# Patient Record
Sex: MALE | Race: OTHER
[De-identification: names, ages, dates, MRNs, and addresses within clinical notes are randomized per-mention and may not be internally consistent; named-entity substitution may affect disease eponyms.]

---

## 2018-02-12 ENCOUNTER — HOSPITAL ENCOUNTER (INPATIENT)
Dept: HOSPITAL 74 - YASAS | Age: 48
LOS: 5 days | Discharge: HOME | DRG: 773 | End: 2018-02-17
Attending: INTERNAL MEDICINE | Admitting: INTERNAL MEDICINE
Payer: COMMERCIAL

## 2018-02-12 VITALS — BODY MASS INDEX: 28.9 KG/M2

## 2018-02-12 DIAGNOSIS — F11.23: Primary | ICD-10-CM

## 2018-02-12 DIAGNOSIS — J45.909: ICD-10-CM

## 2018-02-12 DIAGNOSIS — Z91.013: ICD-10-CM

## 2018-02-12 DIAGNOSIS — Z79.84: ICD-10-CM

## 2018-02-12 DIAGNOSIS — I10: ICD-10-CM

## 2018-02-12 DIAGNOSIS — F17.213: ICD-10-CM

## 2018-02-12 DIAGNOSIS — F14.20: ICD-10-CM

## 2018-02-12 DIAGNOSIS — E11.9: ICD-10-CM

## 2018-02-12 DIAGNOSIS — G47.00: ICD-10-CM

## 2018-02-12 DIAGNOSIS — E78.00: ICD-10-CM

## 2018-02-12 DIAGNOSIS — F10.230: ICD-10-CM

## 2018-02-12 LAB
APPEARANCE UR: CLEAR
BILIRUB UR STRIP.AUTO-MCNC: NEGATIVE MG/DL
COLOR UR: YELLOW
KETONES UR QL STRIP: NEGATIVE
LEUKOCYTE ESTERASE UR QL STRIP.AUTO: NEGATIVE
NITRITE UR QL STRIP: NEGATIVE
PH UR: 6 [PH] (ref 5–8)
PROT UR QL STRIP: NEGATIVE
PROT UR QL STRIP: NEGATIVE
RBC # UR STRIP: NEGATIVE /UL
SP GR UR: 1.02 (ref 1–1.03)
UROBILINOGEN UR STRIP-MCNC: 2 MG/DL (ref 0.2–1)

## 2018-02-12 PROCEDURE — HZ2ZZZZ DETOXIFICATION SERVICES FOR SUBSTANCE ABUSE TREATMENT: ICD-10-PCS | Performed by: INTERNAL MEDICINE

## 2018-02-12 RX ADMIN — NICOTINE SCH MG: 21 PATCH TRANSDERMAL at 19:53

## 2018-02-12 RX ADMIN — ATORVASTATIN CALCIUM SCH MG: 10 TABLET, FILM COATED ORAL at 21:59

## 2018-02-12 RX ADMIN — HYDROXYZINE PAMOATE PRN MG: 50 CAPSULE ORAL at 23:37

## 2018-02-12 RX ADMIN — Medication SCH MG: at 21:59

## 2018-02-12 RX ADMIN — ALBUTEROL SULFATE SCH: 2.5 SOLUTION RESPIRATORY (INHALATION) at 20:07

## 2018-02-12 NOTE — PN
[facility] Progress Note


Note: 





Report received from AAYUSH Wong re: patients BP 1/59/113. 


Clonidine 0.1mg STAT dose order


Repeat V/S 


Continue to monitor

## 2018-02-12 NOTE — HP
COWS





- Scale


Resting Pulse: 0= [place] 80 or Below


Sweatin= Chills/Flushing


Restless Observation: 3= Extraneous Movement


Pupil Size: 0= Normal to Room Light


Bone or Joint Aches: 4=Acute Joint/Muscle Pain


Runny Nose/ Eye Tearin= Runny Nose/Eyes


GI Upset > 30mins: 1= Stomach Cramp


Tremor Observation: 1= Tremor Felt, Not Seen


Yawning Observation: 1= 1-2x During Session


Anxiety or Irritability: 2=Irritable/Anxious


Goose Flesh Skin: 0=Smooth Skin


COWS Score: 15





CIWA Score





- CIWA Score


Nausea/Vomitin-No Nausea/No Vomiting


Muscle Tremors: 2


Anxiety: 4-Mod. Anxious/Guarded


Agitation: 3


Paroxysmal Sweats: 1-Minimal Palms Moist


Orientation: 0-Oriented


Tacttile Disturbances: 3-Moderate Itch/Numb/Burn


Auditory Disturbances: 0-None


Visual Disturbances: 0-None


Headache: 1-Very Mild


CIWA-Ar Total Score: 14





Admission ROS S





- HPI


Chief Complaint: 





WITHDRAWAL SX FROM ALCOHOL AND HEROIN


Allergies/Adverse Reactions: 


 Allergies











Allergy/AdvReac Type Severity Reaction Status Date / Time


 


Fish Containing Products Allergy Severe Difficulty Verified 18 17:04





   Breathing  











History of Present Illness: 





48 Y/O MALE WITH A HX HEROIN AND ALCOHOL DEPENDENCE SEEKING DETOX [place].


Exam Limitations: No Limitations





- Ebola screening


Have you traveled outside of the country in the last 21 days: No


Have you had contact with anyone from an Ebola affected area: No


Have you been sick,other than usual withdrawal symptoms: No


Do you have a fever: No





- Review of Systems


Constitutional: Chills, Loss of Appetite, Night Sweats, Changes in sleep


EENT: reports: Tearing, Nose Congestion


Respiratory: reports: No Symptoms reported


Cardiac: reports: Lightheadedness


GI: reports: Constipated, Nausea, Poor Appetite, Poor Fluid Intake, Vomiting, 

Indigestion, Abdominal cramping


: reports: No Symptoms Reported


Musculoskeletal: reports: Back Pain, Joint Pain, Muscle Pain


Integumentary: reports: No Symptoms Reported


Neuro: reports: Unsteady Gait, Dizziness


Endocrine: reports: No Symptoms Reported


Hematology: reports: No Symptoms Reported


Psychiatric: reports: Orientated x3, Anxious


Other Systems: Reviewed and Negative





Patient History





- Patient Medical History


Hx Anemia: No


Hx Asthma: No


Hx Chronic Obstructive Pulmonary Disease (COPD): No


Hx Cardiac Disorders: No


Hx Hypertension: Yes (ON ENALAPRIL 10 MG PO DAILY)


Hx Hypercholesterolemia: Yes (ON  LIPITOR 10 MG HS )


HX Cerebrovascular Accident: No


Hx Seizures: No


Hx Diabetes: Yes (ON METFORMIN 500 MG PO BID)


Hx Liver Disease: No


Hx Genitourinary Disorders: No


Hx Sexually Transmitted Disorders: No (DENIES)


Hx Renal Disease (ESRD): No


Hx Thyroid Disease: No


Hx Human Immunodeficiency Virus (HIV): No (NEGATIVE HX)


Hx Hepatitis C: No


Hx Depression: No (DENIES)


Hx Suicide Attempt: No (DENIES PAST/PRESENT S/H/I)


Hx Bipolar Disorder: No


Hx Schizophrenia: No





- Patient Surgical History


Past Surgical History: No


Hx Neurologic Surgery: No


Hx Cataract Extraction: No


Hx Cardiac Surgery: No


Hx Lung Surgery: No


Hx Breast Surgery: No


Hx Breast Biopsy: No


Hx Abdominal Surgery: No


Hx Appendectomy: No


Hx Cholecystectomy: No


Hx Genitourinary Surgery: No


Hx Orthopedic Surgery: No


Anesthesia Reaction: No





- PPD History


Previous Implant?: Yes


Documented Results: Negative w/o proof


Implanted On Prior R Admission?: No


PPD to be Administered?: Yes





- Reproductive History


Patient is a Female of Child Bearing Age (11 -55 yrs old): No (MALE)





- Smoking Cessation


Smoking history: Current every day smoker


Have you smoked in the past 12 months: Yes


Aproximately how many cigarettes per day: 35


Hx Chewing Tobacco Use: No


Initiated information on smoking cessation: Yes


'Breaking Loose' booklet given: 18





- Substance & Tx. History


Hx Alcohol Use: Yes (VODKA/COGNAC)


Hx Substance Use: Yes (HEROIN)


Substance Use Type: Alcohol, Heroin


Hx Substance Use Treatment: Yes (LAST TX AT [facility] LONG TIME AGO)





- Substances Abused


  ** Alcohol


Route: Oral


Amount used: 1/2 FIFTH TO A FIFTH


Age of first use: 26


Date of Last Use: 18





  ** Heroin


Route: Inhalation


Frequency: Daily


Amount used: 35 - 40 BAGS


Age of first use: 34


Date of Last Use: 18





Family Disease History





- Family Disease History


Family Disease History: Diabetes: Grandparent (HTN), [place]: Mother (), 

Other: Grandparent





Admission Physical Exam [facility]





- Vital Signs


Vital Signs: 


 Vital Signs - 24 hr











  18





  14:17


 


Temperature 97.4 F L


 


Pulse Rate 79


 


Respiratory 18





Rate 


 


Blood Pressure 144/99














- Physical


General Appearance: Yes: Moderate Distress, Alcohol on Breath, Irritable, 

Anxious


HEENTM: Yes: EOMI, Normocephalic, JAQUELIN, Pharynx Normal, Nasal Congestion


Respiratory: Yes: Chest Non-Tender, Lungs Clear, Normal Breath Sounds


Neck: Yes: No masses,lesions,Nodules, Supple, Trachea in good position


Breast: Yes: Breast Exam Deferred


Cardiology: Yes: Regular Rhythm, Regular Rate, S1, S2


Abdominal: Yes: Normal Bowel Sounds, Non Tender, Flat, Soft


Genitourinary: Yes: Other (N/C)


Back: Yes: Within Normal Limits


Musculoskeletal: Yes: full range of Motion, Gait Steady


Extremities: Yes: Normal Range of Motion, Non-Tender


Neurological: Yes: CNs II-XII NML intact, Fully Oriented, Alert, Motor Strength 

5/5


Integumentary: Yes: Dry, Warm


Lymphatic: Yes: Within Normal Limits





- Diagnostic


(1) Opioid dependence with withdrawal


Current Visit: Yes   Status: Acute   





(2) Alcohol dependence with uncomplicated withdrawal


Current Visit: Yes   Status: Acute   





(3) Hypertension


Current Visit: Yes   Status: Chronic   


Qualifiers: 


   Hypertension type: essential hypertension   Qualified Code(s): I10 - 

Essential (primary) hypertension   





(4) Hypercholesterolemia


Current Visit: Yes   Status: Chronic   





(5) Type 2 diabetes mellitus


Current Visit: Yes   Status: Chronic   


Qualifiers: 


   Diabetes mellitus complication status: without complication 





(6) Nicotine dependence


Current Visit: Yes   Status: Acute   


Qualifiers: 


   Nicotine product type: cigarettes   Substance use status: in withdrawal   

Qualified Code(s): F17.213 - Nicotine dependence, cigarettes, with withdrawal   





Cleared for Admission [facility]





- Detox or Rehab


[facility] Level of Care: Medically Managed


Detox Regimen/Protocol: Methadone/Librium





[facility] Breath Alcohol Content


Breath Alcohol Content: 10





Urine Drug Screen





- Results


Drug Screen Negative: No


Urine Drug Screen Results: GRIFFIN-Cocaine, OPI-Opiates, MTD-Methadone, OXY-

Oxycodone

## 2018-02-13 LAB
ALBUMIN SERPL-MCNC: 3.7 G/DL (ref 3.4–5)
ALP SERPL-CCNC: 80 U/L (ref 45–117)
ALT SERPL-CCNC: 23 U/L (ref 12–78)
ANION GAP SERPL CALC-SCNC: 8 MMOL/L (ref 8–16)
AST SERPL-CCNC: 19 U/L (ref 15–37)
BILIRUB SERPL-MCNC: 0.5 MG/DL (ref 0.2–1)
BUN SERPL-MCNC: 14 MG/DL (ref 7–18)
CALCIUM SERPL-MCNC: 9.3 MG/DL (ref 8.5–10.1)
CHLORIDE SERPL-SCNC: 107 MMOL/L (ref 98–107)
CO2 SERPL-SCNC: 29 MMOL/L (ref 21–32)
CREAT SERPL-MCNC: 0.7 MG/DL (ref 0.7–1.3)
DEPRECATED RDW RBC AUTO: 13.1 % (ref 11.9–15.9)
GLUCOSE SERPL-MCNC: 98 MG/DL (ref 74–106)
HCT VFR BLD CALC: 41.2 % (ref 35.4–49)
HGB BLD-MCNC: 13.8 GM/DL (ref 11.7–16.9)
MCH RBC QN AUTO: 30.3 PG (ref 25.7–33.7)
MCHC RBC AUTO-ENTMCNC: 33.4 G/DL (ref 32–35.9)
MCV RBC: 90.7 FL (ref 80–96)
PLATELET # BLD AUTO: 127 K/MM3 (ref 134–434)
PMV BLD: 9.9 FL (ref 7.5–11.1)
POTASSIUM SERPLBLD-SCNC: 3.5 MMOL/L (ref 3.5–5.1)
PROT SERPL-MCNC: 6.7 G/DL (ref 6.4–8.2)
RBC # BLD AUTO: 4.55 M/MM3 (ref 4–5.6)
SICKLE CELL SCREEN: NEGATIVE
SODIUM SERPL-SCNC: 144 MMOL/L (ref 136–145)
WBC # BLD AUTO: 10.1 K/MM3 (ref 4–10)

## 2018-02-13 RX ADMIN — Medication SCH TAB: at 10:43

## 2018-02-13 RX ADMIN — ALBUTEROL SULFATE SCH: 2.5 SOLUTION RESPIRATORY (INHALATION) at 22:08

## 2018-02-13 RX ADMIN — METFORMIN HYDROCHLORIDE SCH MG: 500 TABLET ORAL at 17:37

## 2018-02-13 RX ADMIN — NICOTINE SCH MG: 21 PATCH TRANSDERMAL at 10:44

## 2018-02-13 RX ADMIN — HYDROXYZINE PAMOATE PRN MG: 50 CAPSULE ORAL at 22:08

## 2018-02-13 RX ADMIN — ASPIRIN 81 MG SCH MG: 81 TABLET ORAL at 10:43

## 2018-02-13 RX ADMIN — LISINOPRIL SCH MG: 10 TABLET ORAL at 10:43

## 2018-02-13 RX ADMIN — METFORMIN HYDROCHLORIDE SCH MG: 500 TABLET ORAL at 07:06

## 2018-02-13 RX ADMIN — Medication SCH MG: at 22:06

## 2018-02-13 RX ADMIN — ALBUTEROL SULFATE SCH: 2.5 SOLUTION RESPIRATORY (INHALATION) at 10:44

## 2018-02-13 RX ADMIN — ALBUTEROL SULFATE SCH: 2.5 SOLUTION RESPIRATORY (INHALATION) at 16:20

## 2018-02-13 RX ADMIN — ATORVASTATIN CALCIUM SCH MG: 10 TABLET, FILM COATED ORAL at 22:06

## 2018-02-13 NOTE — EKG
Test Reason : 

Blood Pressure : ***/*** mmHG

Vent. Rate : 076 BPM     Atrial Rate : 076 BPM

   P-R Int : 134 ms          QRS Dur : 090 ms

    QT Int : 388 ms       P-R-T Axes : 072 029 043 degrees

   QTc Int : 436 ms

 

NORMAL SINUS RHYTHM

POSSIBLE LEFT ATRIAL ENLARGEMENT

BORDERLINE ECG

NO PREVIOUS ECGS AVAILABLE

Confirmed by MD Yehuda, Jm ([id number]) on 2/13/2018 1:58:28 PM

 

Referred By:             Confirmed By:Jm Blackman MD

## 2018-02-13 NOTE — PN
S CIWA





- CIWA Score


Nausea/Vomitin-No Nausea/No Vomiting


Muscle Tremors: 4-Moderate,w/Arms Extend


Anxiety: 4-Mod. Anxious/Guarded


Agitation: 4-Moderately Restless


Paroxysmal Sweats: 1-Minimal Palms Moist


Orientation: 0-Oriented


Tacttile Disturbances: 3-Moderate Itch/Numb/Burn


Auditory Disturbances: 0-None


Visual Disturbances: 0-None


Headache: 0-None Present


CIWA-Ar Total Score: 16





[facility] COWS





- Scale


Resting Pulse: 0= [place] 80 or Below


Sweatin= Chills/Flushing


Restless Observation: 3= Extraneous Movement


Pupil Size: 2= Moderately Dilated


Bone or Joint Aches: 4=Acute Joint/Muscle Pain


Runny Nose/ Eye Tearin= Nasal Congestion


GI Upset > 30mins: 0= None


Tremor Observation of Outstretched Hands: 1= Tremor Felt, Not Seen


Yawning Observation: 1= 1-2x During Session


Anxiety or Irritability: 2=Irritable/Anxious


Goose Flesh Skin: 0=Smooth Skin


COWS Score: 15





[facility] Progress Note (SOAP)


Subjective: 





C/O IRRITABILITY,RESTLESSNESS,ANXIETY, HOT/COLD FLASHES,INTERMITTENT SLEEP LAST 

NIGHT,


Objective: 





18 11:35


 Vital Signs











Temperature  96.2 F L  18 09:50


 


Pulse Rate  74   18 09:50


 


Respiratory Rate  20   18 09:50


 


Blood Pressure  137/98   18 09:50


 


O2 Sat by Pulse Oximetry (%)      








 Laboratory Last Values











WBC  10.1 K/mm3 (4.0-10.0)  H  18  07:30    


 


RBC  4.55 M/mm3 (4.00-5.60)   18  07:30    


 


Hgb  13.8 GM/dL (11.7-16.9)   18  07:30    


 


Hct  41.2 % (35.4-49)   18  07:30    


 


MCV  90.7 fl (80-96)   18  07:30    


 


MCH  30.3 pg (25.7-33.7)   18  07:30    


 


MCHC  33.4 g/dl (32.0-35.9)   18  07:30    


 


RDW  13.1 % (11.9-15.9)   18  07:30    


 


Plt Count  127 K/MM3 (134-434)  L  18  07:30    


 


MPV  9.9 fl (7.5-11.1)   18  07:30    


 


Sodium  144 mmol/L (136-145)   18  07:30    


 


Potassium  3.5 mmol/L (3.5-5.1)   18  07:30    


 


Chloride  107 mmol/L ()   18  07:30    


 


Carbon Dioxide  29 mmol/L (21-32)   18  07:30    


 


Anion Gap  8  (8-16)   18  07:30    


 


BUN  14 mg/dL (7-18)   18  07:30    


 


Creatinine  0.7 mg/dL (0.7-1.3)   18  07:30    


 


Creat Clearance w eGFR  > 60  (>60)   18  07:30    


 


POC Glucometer  84 UNITS ()   18  05:21    


 


Random Glucose  98 mg/dL ()   18  07:30    


 


Calcium  9.3 mg/dL (8.5-10.1)   18  07:30    


 


Total Bilirubin  0.5 mg/dL (0.2-1.0)   18  07:30    


 


AST  19 U/L (15-37)   18  07:30    


 


ALT  23 U/L (12-78)   18  07:30    


 


Alkaline Phosphatase  80 U/L ()   18  07:30    


 


Total Protein  6.7 g/dl (6.4-8.2)   18  07:30    


 


Albumin  3.7 g/dl (3.4-5.0)   18  07:30    


 


Urine Color  Yellow   18  22:50    


 


Urine Appearance  Clear   18  22:50    


 


Urine pH  6.0  (5.0-8.0)   18  22:50    


 


Ur Specific Gravity  1.024  (1.001-1.035)   18  22:50    


 


Urine Protein  Negative  (NEGATIVE)   18  22:50    


 


Urine Glucose (UA)  Negative  (NEGATIVE)   18  22:50    


 


Urine Ketones  Negative  (NEGATIVE)   18  22:50    


 


Urine Blood  Negative  (NEGATIVE)   18  22:50    


 


Urine Nitrite  Negative  (NEGATIVE)   18  22:50    


 


Urine Bilirubin  Negative  (NEGATIVE)   18  22:50    


 


Urine Urobilinogen  2.0 mg/dL (0.2-1.0)   18  22:50    


 


Ur Leukocyte Esterase  Negative  (NEGATIVE)   18  22:50    











Assessment: 





18 11:36


WITHDRAWAL SX


Plan: 





CONTINUE DETOX

## 2018-02-14 RX ADMIN — NICOTINE SCH: 21 PATCH TRANSDERMAL at 11:07

## 2018-02-14 RX ADMIN — METFORMIN HYDROCHLORIDE SCH MG: 500 TABLET ORAL at 17:27

## 2018-02-14 RX ADMIN — LISINOPRIL SCH MG: 10 TABLET ORAL at 10:43

## 2018-02-14 RX ADMIN — CYCLOBENZAPRINE HYDROCHLORIDE SCH MG: 10 TABLET, FILM COATED ORAL at 22:05

## 2018-02-14 RX ADMIN — ATORVASTATIN CALCIUM SCH MG: 10 TABLET, FILM COATED ORAL at 22:05

## 2018-02-14 RX ADMIN — ALBUTEROL SULFATE SCH AMP: 2.5 SOLUTION RESPIRATORY (INHALATION) at 16:50

## 2018-02-14 RX ADMIN — HYDROXYZINE PAMOATE PRN MG: 50 CAPSULE ORAL at 17:37

## 2018-02-14 RX ADMIN — Medication SCH MG: at 22:05

## 2018-02-14 RX ADMIN — ALBUTEROL SULFATE SCH: 2.5 SOLUTION RESPIRATORY (INHALATION) at 11:06

## 2018-02-14 RX ADMIN — METHADONE HYDROCHLORIDE SCH MG: 5 TABLET ORAL at 10:43

## 2018-02-14 RX ADMIN — ASPIRIN 81 MG SCH MG: 81 TABLET ORAL at 10:43

## 2018-02-14 RX ADMIN — Medication SCH TAB: at 10:43

## 2018-02-14 RX ADMIN — HYDROXYZINE PAMOATE PRN MG: 50 CAPSULE ORAL at 22:06

## 2018-02-14 RX ADMIN — ALBUTEROL SULFATE SCH: 2.5 SOLUTION RESPIRATORY (INHALATION) at 20:30

## 2018-02-14 RX ADMIN — METFORMIN HYDROCHLORIDE SCH MG: 500 TABLET ORAL at 06:06

## 2018-02-14 RX ADMIN — CYCLOBENZAPRINE HYDROCHLORIDE SCH MG: 10 TABLET, FILM COATED ORAL at 13:45

## 2018-02-14 NOTE — PN
S CIWA





- CIWA Score


Nausea/Vomitin-No Nausea/No Vomiting


Muscle Tremors: 4-Moderate,w/Arms Extend


Anxiety: 4-Mod. Anxious/Guarded


Agitation: 4-Moderately Restless


Paroxysmal Sweats: 1-Minimal Palms Moist


Orientation: 0-Oriented


Tacttile Disturbances: 3-Moderate Itch/Numb/Burn


Auditory Disturbances: 0-None


Visual Disturbances: 0-None


Headache: 0-None Present


CIWA-Ar Total Score: 16





[facility] COWS





- Scale


Resting Pulse: 0= [place] 80 or Below


Sweatin= Chills/Flushing


Restless Observation: 3= Extraneous Movement


Pupil Size: 2= Moderately Dilated


Bone or Joint Aches: 4=Acute Joint/Muscle Pain


Runny Nose/ Eye Tearin= Nasal Congestion


GI Upset > 30mins: 0= None


Tremor Observation of Outstretched Hands: 2= Slight Tremor Visible


Yawning Observation: 2= >3x During Session


Anxiety or Irritability: 2=Irritable/Anxious


Goose Flesh Skin: 0=Smooth Skin


COWS Score: 17





B[facility] Progress Note (SOAP)


Subjective: 





BODY ACHES,ANXIETY,IRRITABILITY,SWEATS,INTERMITTENT SLEEP.


Objective: 





18 11:33


 Vital Signs











Temperature  95.5 F L  18 09:43


 


Pulse Rate  73   18 09:43


 


Respiratory Rate  18   18 09:43


 


Blood Pressure  155/102   18 09:43


 


O2 Sat by Pulse Oximetry (%)      








 Laboratory Last Values











WBC  10.1 K/mm3 (4.0-10.0)  H  18  07:30    


 


RBC  4.55 M/mm3 (4.00-5.60)   18  07:30    


 


Hgb  13.8 GM/dL (11.7-16.9)   18  07:30    


 


Hct  41.2 % (35.4-49)   18  07:30    


 


MCV  90.7 fl (80-96)   18  07:30    


 


MCH  30.3 pg (25.7-33.7)   18  07:30    


 


MCHC  33.4 g/dl (32.0-35.9)   18  07:30    


 


RDW  13.1 % (11.9-15.9)   18  07:30    


 


Plt Count  127 K/MM3 (134-434)  L  18  07:30    


 


MPV  9.9 fl (7.5-11.1)   18  07:30    


 


Sickle Cell Screen  Negative  (NEGATIVE)   18  07:30    


 


Sodium  144 mmol/L (136-145)   18  07:30    


 


Potassium  3.5 mmol/L (3.5-5.1)   18  07:30    


 


Chloride  107 mmol/L ()   18  07:30    


 


Carbon Dioxide  29 mmol/L (21-32)   18  07:30    


 


Anion Gap  8  (8-16)   18  07:30    


 


BUN  14 mg/dL (7-18)   18  07:30    


 


Creatinine  0.7 mg/dL (0.7-1.3)   18  07:30    


 


Creat Clearance w eGFR  > 60  (>60)   18  07:30    


 


POC Glucometer  150 UNITS ()   18  06:06    


 


Random Glucose  98 mg/dL ()   18  07:30    


 


Calcium  9.3 mg/dL (8.5-10.1)   18  07:30    


 


Total Bilirubin  0.5 mg/dL (0.2-1.0)   18  07:30    


 


AST  19 U/L (15-37)   18  07:30    


 


ALT  23 U/L (12-78)   18  07:30    


 


Alkaline Phosphatase  80 U/L ()   18  07:30    


 


Total Protein  6.7 g/dl (6.4-8.2)   18  07:30    


 


Albumin  3.7 g/dl (3.4-5.0)   18  07:30    


 


Urine Color  Yellow   18  22:50    


 


Urine Appearance  Clear   18  22:50    


 


Urine pH  6.0  (5.0-8.0)   18  22:50    


 


Ur Specific Gravity  1.024  (1.001-1.035)   18  22:50    


 


Urine Protein  Negative  (NEGATIVE)   18  22:50    


 


Urine Glucose (UA)  Negative  (NEGATIVE)   18  22:50    


 


Urine Ketones  Negative  (NEGATIVE)   18  22:50    


 


Urine Blood  Negative  (NEGATIVE)   18  22:50    


 


Urine Nitrite  Negative  (NEGATIVE)   18  22:50    


 


Urine Bilirubin  Negative  (NEGATIVE)   18  22:50    


 


Urine Urobilinogen  2.0 mg/dL (0.2-1.0)   18  22:50    


 


Ur Leukocyte Esterase  Negative  (NEGATIVE)   18  22:50    


 


RPR Titer  Nonreactive  (NONREACTIVE)   18  07:30    











Assessment: 





18 11:33


WITHDRAWAL SX


Plan: 





CONTINUE DETOX


BENADRYL AS DIRECTED HS

## 2018-02-15 RX ADMIN — LISINOPRIL SCH MG: 10 TABLET ORAL at 10:33

## 2018-02-15 RX ADMIN — ASPIRIN 81 MG SCH MG: 81 TABLET ORAL at 10:33

## 2018-02-15 RX ADMIN — ALBUTEROL SULFATE SCH: 2.5 SOLUTION RESPIRATORY (INHALATION) at 11:28

## 2018-02-15 RX ADMIN — Medication SCH TAB: at 10:34

## 2018-02-15 RX ADMIN — METHADONE HYDROCHLORIDE SCH MG: 5 TABLET ORAL at 10:33

## 2018-02-15 RX ADMIN — ALBUTEROL SULFATE SCH: 2.5 SOLUTION RESPIRATORY (INHALATION) at 15:11

## 2018-02-15 RX ADMIN — CYCLOBENZAPRINE HYDROCHLORIDE SCH MG: 10 TABLET, FILM COATED ORAL at 15:11

## 2018-02-15 RX ADMIN — NICOTINE SCH: 21 PATCH TRANSDERMAL at 10:34

## 2018-02-15 RX ADMIN — ALBUTEROL SULFATE SCH: 2.5 SOLUTION RESPIRATORY (INHALATION) at 22:11

## 2018-02-15 RX ADMIN — ALBUTEROL SULFATE SCH: 2.5 SOLUTION RESPIRATORY (INHALATION) at 17:26

## 2018-02-15 RX ADMIN — HYDROXYZINE PAMOATE PRN MG: 50 CAPSULE ORAL at 17:28

## 2018-02-15 RX ADMIN — METFORMIN HYDROCHLORIDE SCH: 500 TABLET ORAL at 08:14

## 2018-02-15 RX ADMIN — CYCLOBENZAPRINE HYDROCHLORIDE SCH MG: 10 TABLET, FILM COATED ORAL at 22:11

## 2018-02-15 RX ADMIN — CYCLOBENZAPRINE HYDROCHLORIDE SCH: 10 TABLET, FILM COATED ORAL at 08:14

## 2018-02-15 RX ADMIN — ATORVASTATIN CALCIUM SCH MG: 10 TABLET, FILM COATED ORAL at 22:11

## 2018-02-15 RX ADMIN — Medication SCH MG: at 22:10

## 2018-02-15 RX ADMIN — METFORMIN HYDROCHLORIDE SCH MG: 500 TABLET ORAL at 17:26

## 2018-02-15 NOTE — PN
BHS Progress Note (SOAP)


Subjective: 





ANXIETY,SWEATS,IRRITABILITY,INTERMITTENT SLEEP.


Objective: 





02/15/18 12:07


 Vital Signs











Temperature  95.8 F L  02/15/18 09:33


 


Pulse Rate  62   02/15/18 09:33


 


Respiratory Rate  18   02/15/18 09:33


 


Blood Pressure  145/91   02/15/18 09:33


 


O2 Sat by Pulse Oximetry (%)      








 Laboratory Last Values











WBC  10.1 K/mm3 (4.0-10.0)  H  02/13/18  07:30    


 


RBC  4.55 M/mm3 (4.00-5.60)   02/13/18  07:30    


 


Hgb  13.8 GM/dL (11.7-16.9)   02/13/18  07:30    


 


Hct  41.2 % (35.4-49)   02/13/18  07:30    


 


MCV  90.7 fl (80-96)   02/13/18  07:30    


 


MCH  30.3 pg (25.7-33.7)   02/13/18  07:30    


 


MCHC  33.4 g/dl (32.0-35.9)   02/13/18  07:30    


 


RDW  13.1 % (11.9-15.9)   02/13/18  07:30    


 


Plt Count  127 K/MM3 (134-434)  L  02/13/18  07:30    


 


MPV  9.9 fl (7.5-11.1)   02/13/18  07:30    


 


Sickle Cell Screen  Negative  (NEGATIVE)   02/13/18  07:30    


 


Sodium  144 mmol/L (136-145)   02/13/18  07:30    


 


Potassium  3.5 mmol/L (3.5-5.1)   02/13/18  07:30    


 


Chloride  107 mmol/L ()   02/13/18  07:30    


 


Carbon Dioxide  29 mmol/L (21-32)   02/13/18  07:30    


 


Anion Gap  8  (8-16)   02/13/18  07:30    


 


BUN  14 mg/dL (7-18)   02/13/18  07:30    


 


Creatinine  0.7 mg/dL (0.7-1.3)   02/13/18  07:30    


 


Creat Clearance w eGFR  > 60  (>60)   02/13/18  07:30    


 


POC Glucometer  105 UNITS ()   02/14/18  16:26    


 


Random Glucose  98 mg/dL ()   02/13/18  07:30    


 


Calcium  9.3 mg/dL (8.5-10.1)   02/13/18  07:30    


 


Total Bilirubin  0.5 mg/dL (0.2-1.0)   02/13/18  07:30    


 


AST  19 U/L (15-37)   02/13/18  07:30    


 


ALT  23 U/L (12-78)   02/13/18  07:30    


 


Alkaline Phosphatase  80 U/L ()   02/13/18  07:30    


 


Total Protein  6.7 g/dl (6.4-8.2)   02/13/18  07:30    


 


Albumin  3.7 g/dl (3.4-5.0)   02/13/18  07:30    


 


Urine Color  Yellow   02/12/18  22:50    


 


Urine Appearance  Clear   02/12/18  22:50    


 


Urine pH  6.0  (5.0-8.0)   02/12/18  22:50    


 


Ur Specific Gravity  1.024  (1.001-1.035)   02/12/18  22:50    


 


Urine Protein  Negative  (NEGATIVE)   02/12/18  22:50    


 


Urine Glucose (UA)  Negative  (NEGATIVE)   02/12/18  22:50    


 


Urine Ketones  Negative  (NEGATIVE)   02/12/18  22:50    


 


Urine Blood  Negative  (NEGATIVE)   02/12/18  22:50    


 


Urine Nitrite  Negative  (NEGATIVE)   02/12/18  22:50    


 


Urine Bilirubin  Negative  (NEGATIVE)   02/12/18  22:50    


 


Urine Urobilinogen  2.0 mg/dL (0.2-1.0)   02/12/18  22:50    


 


Ur Leukocyte Esterase  Negative  (NEGATIVE)   02/12/18  22:50    


 


RPR Titer  Nonreactive  (NONREACTIVE)   02/13/18  07:30    











Assessment: 





02/15/18 12:07


WITHDRAWAL SX


Plan: 





CONTINUE DETOX


BENADRYL HS AS DIRECTED

## 2018-02-16 RX ADMIN — CYCLOBENZAPRINE HYDROCHLORIDE SCH MG: 10 TABLET, FILM COATED ORAL at 07:20

## 2018-02-16 RX ADMIN — Medication SCH TAB: at 10:23

## 2018-02-16 RX ADMIN — METFORMIN HYDROCHLORIDE SCH MG: 500 TABLET ORAL at 17:32

## 2018-02-16 RX ADMIN — Medication SCH MG: at 22:15

## 2018-02-16 RX ADMIN — ALBUTEROL SULFATE SCH: 2.5 SOLUTION RESPIRATORY (INHALATION) at 22:17

## 2018-02-16 RX ADMIN — ASPIRIN 81 MG SCH MG: 81 TABLET ORAL at 10:23

## 2018-02-16 RX ADMIN — ALBUTEROL SULFATE SCH: 2.5 SOLUTION RESPIRATORY (INHALATION) at 10:46

## 2018-02-16 RX ADMIN — METFORMIN HYDROCHLORIDE SCH MG: 500 TABLET ORAL at 07:19

## 2018-02-16 RX ADMIN — ATORVASTATIN CALCIUM SCH MG: 10 TABLET, FILM COATED ORAL at 22:15

## 2018-02-16 RX ADMIN — CYCLOBENZAPRINE HYDROCHLORIDE SCH: 10 TABLET, FILM COATED ORAL at 15:12

## 2018-02-16 RX ADMIN — LISINOPRIL SCH MG: 10 TABLET ORAL at 10:23

## 2018-02-16 RX ADMIN — CYCLOBENZAPRINE HYDROCHLORIDE SCH MG: 10 TABLET, FILM COATED ORAL at 22:15

## 2018-02-16 RX ADMIN — NICOTINE SCH: 21 PATCH TRANSDERMAL at 10:46

## 2018-02-16 RX ADMIN — ALBUTEROL SULFATE SCH: 2.5 SOLUTION RESPIRATORY (INHALATION) at 15:12

## 2018-02-16 NOTE — CONSULT
[facility] Psychiatric Consult





- Data


Date of interview: 02/16/18


Admission source: [facility]


Identifying data: First admission to [facility] for this 46 y/o  male 

seeking detox treatment [address] for alcohol,heroin and cocaine 

dependence.Patient is single,father of one,domiciled,unemployed and supported 

on food stamps.


Substance Abuse History: Confirmed by patient in this session.Refer to current 

[facility] report for details.Smoking history: Current every day smoker.  Have you 

smoked in the past 12 months: Yes.  Aproximately how many cigarettes per day: 

35.  Hx Chewing Tobacco Use: No.  Initiated information on smoking cessation: 

Yes.  'Breaking Loose' booklet given: 02/12/18.  - Substance & Tx. History.  Hx 

Alcohol Use: Yes (VODKA/COGNAC).  Hx Substance Use: Yes (HEROIN).  Substance 

Use Type: Alcohol, Heroin.  Hx Substance Use Treatment: Yes (LAST TX AT [facility] LONG TIME AGO).  - Substances Abused.  ** Alcohol.  Route: Oral.  

Amount used: 1/2 FIFTH TO A FIFTH.  Age of first use: 26.  Date of Last Use: 02/ 12/18.  ** Heroin.  Route: Inhalation.  Frequency: Daily.  Amount used: 35 - 40 

BAGS.  Age of first use: 34.  Date of Last Use: 02/12/18


Medical History: Hypertension,diabetes mellitus and dyslipidemia.


Psychiatric History: Patient denies.


Physical/Sexual Abuse/Trauma History: Patient denies.


Additional Comment: Urine Drug Screen Results: GRIFFIN-Cocaine, OPI-Opiates, MTD-

Methadone, OXY-Oxycodone.Noted.





Mental Status Exam





- Mental Status Exam


Alert and Oriented to: Time, Place, Person


Cognitive Function: Good


Patient Appearance: Well Groomed


Mood: Withdrawn, Hopeful


Affect: Appropriate, Normal Range


Patient Behavior: Fatigued, Appropriate, Cooperative


Speech Pattern: Clear, Appropriate


Voice Loudness: Normal


Thought Process: Intact, Goal Oriented


Thought Disorder: Not Present


Hallucinations: Denies


Suicidal Ideation: Denies


Homicidal Ideation: Denies


Insight/Judgement: Poor


Sleep: Poorly, Difficulty falling asleep


Appetite: Good


Muscle strength/Tone: Normal


Gait/Station: Normal





Psychiatric Findings





- Problem List (Axis 1, 2,3)


(1) Opioid dependence with withdrawal


Current Visit: Yes   Status: Acute   





(2) Alcohol dependence with uncomplicated withdrawal


Current Visit: Yes   Status: Acute   





(3) Nicotine dependence


Current Visit: Yes   Status: Acute   


Qualifiers: 


   Nicotine product type: cigarettes   Substance use status: in withdrawal   

Qualified Code(s): F17.213 - Nicotine dependence, cigarettes, with withdrawal   





(4) Cocaine dependence


Current Visit: Yes   Status: Acute   





(5) Insomnia


Current Visit: Yes   Status: Acute   





- Initial Treatment Plan


Initial Treatment Plan: Psychoeducation and support.Detoxification in 

progress.Sleep hygiene.Ambien 10 mg po hs prn.Patient is informed of risk of 

parasomnias.He agrees to this careplan.Observation.

## 2018-02-16 NOTE — PN
[facility] Progress Note (SOAP)


Subjective: 





PT IS OOB AMBULATING WITH STAEDY GAIT. C/O NOT SLEEPING WELL EVEN WITH THE 

BENADRYL. 


Objective: 





02/16/18 10:27


 Vital Signs











Temperature  97.8 F   02/16/18 09:36


 


Pulse Rate  95 H  02/16/18 09:36


 


Respiratory Rate  20   02/16/18 09:36


 


Blood Pressure  126/86   02/16/18 09:36


 


O2 Sat by Pulse Oximetry (%)      








 Laboratory Last Values











WBC  10.1 K/mm3 (4.0-10.0)  H  02/13/18  07:30    


 


RBC  4.55 M/mm3 (4.00-5.60)   02/13/18  07:30    


 


Hgb  13.8 GM/dL (11.7-16.9)   02/13/18  07:30    


 


Hct  41.2 % (35.4-49)   02/13/18  07:30    


 


MCV  90.7 fl (80-96)   02/13/18  07:30    


 


MCH  30.3 pg (25.7-33.7)   02/13/18  07:30    


 


MCHC  33.4 g/dl (32.0-35.9)   02/13/18  07:30    


 


RDW  13.1 % (11.9-15.9)   02/13/18  07:30    


 


Plt Count  127 K/MM3 (134-434)  L  02/13/18  07:30    


 


MPV  9.9 fl (7.5-11.1)   02/13/18  07:30    


 


Sickle Cell Screen  Negative  (NEGATIVE)   02/13/18  07:30    


 


Sodium  144 mmol/L (136-145)   02/13/18  07:30    


 


Potassium  3.5 mmol/L (3.5-5.1)   02/13/18  07:30    


 


Chloride  107 mmol/L ()   02/13/18  07:30    


 


Carbon Dioxide  29 mmol/L (21-32)   02/13/18  07:30    


 


Anion Gap  8  (8-16)   02/13/18  07:30    


 


BUN  14 mg/dL (7-18)   02/13/18  07:30    


 


Creatinine  0.7 mg/dL (0.7-1.3)   02/13/18  07:30    


 


Creat Clearance w eGFR  > 60  (>60)   02/13/18  07:30    


 


POC Glucometer  258 UNITS ()   02/16/18  05:10    


 


Random Glucose  98 mg/dL ()   02/13/18  07:30    


 


Calcium  9.3 mg/dL (8.5-10.1)   02/13/18  07:30    


 


Total Bilirubin  0.5 mg/dL (0.2-1.0)   02/13/18  07:30    


 


AST  19 U/L (15-37)   02/13/18  07:30    


 


ALT  23 U/L (12-78)   02/13/18  07:30    


 


Alkaline Phosphatase  80 U/L ()   02/13/18  07:30    


 


Total Protein  6.7 g/dl (6.4-8.2)   02/13/18  07:30    


 


Albumin  3.7 g/dl (3.4-5.0)   02/13/18  07:30    


 


Urine Color  Yellow   02/12/18  22:50    


 


Urine Appearance  Clear   02/12/18  22:50    


 


Urine pH  6.0  (5.0-8.0)   02/12/18  22:50    


 


Ur Specific Gravity  1.024  (1.001-1.035)   02/12/18  22:50    


 


Urine Protein  Negative  (NEGATIVE)   02/12/18  22:50    


 


Urine Glucose (UA)  Negative  (NEGATIVE)   02/12/18  22:50    


 


Urine Ketones  Negative  (NEGATIVE)   02/12/18  22:50    


 


Urine Blood  Negative  (NEGATIVE)   02/12/18  22:50    


 


Urine Nitrite  Negative  (NEGATIVE)   02/12/18  22:50    


 


Urine Bilirubin  Negative  (NEGATIVE)   02/12/18  22:50    


 


Urine Urobilinogen  2.0 mg/dL (0.2-1.0)   02/12/18  22:50    


 


Ur Leukocyte Esterase  Negative  (NEGATIVE)   02/12/18  22:50    


 


RPR Titer  Nonreactive  (NONREACTIVE)   02/13/18  07:30    











Assessment: 





02/16/18 10:27


WITHDRAWAL SX


Plan: 





CONTINUE DETOX


WILL SEE PSYCH MD FOR INSOMNIA WORK UP TODAY.

## 2018-02-17 VITALS — TEMPERATURE: 97.1 F | SYSTOLIC BLOOD PRESSURE: 116 MMHG | HEART RATE: 114 BPM | DIASTOLIC BLOOD PRESSURE: 69 MMHG

## 2018-02-17 RX ADMIN — ASPIRIN 81 MG SCH: 81 TABLET ORAL at 10:57

## 2018-02-17 RX ADMIN — METFORMIN HYDROCHLORIDE SCH MG: 500 TABLET ORAL at 07:26

## 2018-02-17 RX ADMIN — LISINOPRIL SCH: 10 TABLET ORAL at 10:58

## 2018-02-17 RX ADMIN — NICOTINE SCH: 21 PATCH TRANSDERMAL at 10:57

## 2018-02-17 RX ADMIN — ALBUTEROL SULFATE SCH: 2.5 SOLUTION RESPIRATORY (INHALATION) at 10:57

## 2018-02-17 RX ADMIN — CYCLOBENZAPRINE HYDROCHLORIDE SCH MG: 10 TABLET, FILM COATED ORAL at 05:44

## 2018-02-17 RX ADMIN — Medication SCH: at 10:58

## 2018-02-17 NOTE — DS
[facility] Det[facility] Discharge Summary


Admission Date: 


02/12/18





Discharge Date: 02/17/18





- History


Present History: Alcohol Dependence, Opioid Dependence


Additional Comments: 





PATIENT GOING HOME AT THIS TIME, REPORTS THAT HE WILL PURSUE REHAB ADMISSION ON 

HIS OWN WITHIN THE NEXT FEW DAYS. PATIENT ADVISED TO FOLLOW-UP WITH  DELL MORENO ([place] AFTER DISCHARGE FROM DETOX FOR GENERAL MEDICAL 

ASSESSMENT AND FOR HISTORY OF HTN AND TYPE II DM. PATIENT WAS DISCHARGED FROM 

DETOX UNIT IN STABLE MEDICAL CONDITION.


Pertinent Past History: 





Nicotine Dependence, Asthma, Type II DM, HTN, Hyperchoelsterolemia, Insomnia.





- Physical Exam Results


Vital Signs: 


 Vital Signs











Temperature  97.1 F L  02/17/18 06:16


 


Pulse Rate  114 H  02/17/18 06:16


 


Respiratory Rate  18   02/17/18 06:16


 


Blood Pressure  116/69   02/17/18 06:16


 


O2 Sat by Pulse Oximetry (%)      











Pertinent Admission Physical Exam Findings: 





WITHDRAWAL SYMPTOMS.





 Laboratory Tests











  02/12/18 02/12/18 02/13/18





  19:23 22:50 05:21


 


WBC   


 


RBC   


 


Hgb   


 


Hct   


 


MCV   


 


MCH   


 


MCHC   


 


RDW   


 


Plt Count   


 


MPV   


 


Sickle Cell Screen   


 


Sodium   


 


Potassium   


 


Chloride   


 


Carbon Dioxide   


 


Anion Gap   


 


BUN   


 


Creatinine   


 


Creat Clearance w eGFR   


 


POC Glucometer  94   84


 


Random Glucose   


 


Calcium   


 


Total Bilirubin   


 


AST   


 


ALT   


 


Alkaline Phosphatase   


 


Total Protein   


 


Albumin   


 


Urine Color   Yellow 


 


Urine Appearance   Clear 


 


Urine pH   6.0 


 


Ur Specific Gravity   1.024 


 


Urine Protein   Negative 


 


Urine Glucose (UA)   Negative 


 


Urine Ketones   Negative 


 


Urine Blood   Negative 


 


Urine Nitrite   Negative 


 


Urine Bilirubin   Negative 


 


Urine Urobilinogen   2.0 


 


Ur Leukocyte Esterase   Negative 


 


RPR Titer   














  02/13/18 02/13/18 02/13/18





  07:30 07:30 07:30


 


WBC  10.1 H  


 


RBC  4.55  


 


Hgb  13.8  


 


Hct  41.2  


 


MCV  90.7  


 


MCH  30.3  


 


MCHC  33.4  


 


RDW  13.1  


 


Plt Count  127 L  


 


MPV  9.9  


 


Sickle Cell Screen  Negative  


 


Sodium   144 


 


Potassium   3.5 


 


Chloride   107 


 


Carbon Dioxide   29 


 


Anion Gap   8 


 


BUN   14 


 


Creatinine   0.7 


 


Creat Clearance w eGFR   > 60 


 


POC Glucometer   


 


Random Glucose   98 


 


Calcium   9.3 


 


Total Bilirubin   0.5 


 


AST   19 


 


ALT   23 


 


Alkaline Phosphatase   80 


 


Total Protein   6.7 


 


Albumin   3.7 


 


Urine Color   


 


Urine Appearance   


 


Urine pH   


 


Ur Specific Gravity   


 


Urine Protein   


 


Urine Glucose (UA)   


 


Urine Ketones   


 


Urine Blood   


 


Urine Nitrite   


 


Urine Bilirubin   


 


Urine Urobilinogen   


 


Ur Leukocyte Esterase   


 


RPR Titer    Nonreactive














  02/13/18 02/14/18 02/14/18





  16:22 06:06 16:26


 


WBC   


 


RBC   


 


Hgb   


 


Hct   


 


MCV   


 


MCH   


 


MCHC   


 


RDW   


 


Plt Count   


 


MPV   


 


Sickle Cell Screen   


 


Sodium   


 


Potassium   


 


Chloride   


 


Carbon Dioxide   


 


Anion Gap   


 


BUN   


 


Creatinine   


 


Creat Clearance w eGFR   


 


POC Glucometer  143  150  105


 


Random Glucose   


 


Calcium   


 


Total Bilirubin   


 


AST   


 


ALT   


 


Alkaline Phosphatase   


 


Total Protein   


 


Albumin   


 


Urine Color   


 


Urine Appearance   


 


Urine pH   


 


Ur Specific Gravity   


 


Urine Protein   


 


Urine Glucose (UA)   


 


Urine Ketones   


 


Urine Blood   


 


Urine Nitrite   


 


Urine Bilirubin   


 


Urine Urobilinogen   


 


Ur Leukocyte Esterase   


 


RPR Titer   














  02/15/18 02/16/18 02/16/18





  16:24 05:10 16:27


 


WBC   


 


RBC   


 


Hgb   


 


Hct   


 


MCV   


 


MCH   


 


MCHC   


 


RDW   


 


Plt Count   


 


MPV   


 


Sickle Cell Screen   


 


Sodium   


 


Potassium   


 


Chloride   


 


Carbon Dioxide   


 


Anion Gap   


 


BUN   


 


Creatinine   


 


Creat Clearance w eGFR   


 


POC Glucometer  226  258  232


 


Random Glucose   


 


Calcium   


 


Total Bilirubin   


 


AST   


 


ALT   


 


Alkaline Phosphatase   


 


Total Protein   


 


Albumin   


 


Urine Color   


 


Urine Appearance   


 


Urine pH   


 


Ur Specific Gravity   


 


Urine Protein   


 


Urine Glucose (UA)   


 


Urine Ketones   


 


Urine Blood   


 


Urine Nitrite   


 


Urine Bilirubin   


 


Urine Urobilinogen   


 


Ur Leukocyte Esterase   


 


RPR Titer   














  02/17/18





  05:42


 


WBC 


 


RBC 


 


Hgb 


 


Hct 


 


MCV 


 


MCH 


 


MCHC 


 


RDW 


 


Plt Count 


 


MPV 


 


Sickle Cell Screen 


 


Sodium 


 


Potassium 


 


Chloride 


 


Carbon Dioxide 


 


Anion Gap 


 


BUN 


 


Creatinine 


 


Creat Clearance w eGFR 


 


POC Glucometer  150


 


Random Glucose 


 


Calcium 


 


Total Bilirubin 


 


AST 


 


ALT 


 


Alkaline Phosphatase 


 


Total Protein 


 


Albumin 


 


Urine Color 


 


Urine Appearance 


 


Urine pH 


 


Ur Specific Gravity 


 


Urine Protein 


 


Urine Glucose (UA) 


 


Urine Ketones 


 


Urine Blood 


 


Urine Nitrite 


 


Urine Bilirubin 


 


Urine Urobilinogen 


 


Ur Leukocyte Esterase 


 


RPR Titer 








LABS NOTED.





- Treatment


Hospital Course: Detox Protocol Followed, Detoxed Safely, Responded well, 

Discharged Condition Good


Patient has Accepted a Rehab Referral to: PT GOING HOME, WILL PURSUE REHAB 

ADMISSION ON HIS OWN AT LATER DATE.





- Medication


Discharge Medications: 


Ambulatory Orders





Albuterol 0.083% Nebulizer Sol [Ventolin 0.083%] 1 neb NEB Q4H 02/12/18 


Albuterol Sulfate Inhaler - [Ventolin Hfa Inhaler -] 1 - 2 inh PO Q4H PRN #1 

inhaler 02/17/18 


Aspirin [Children's Aspirin] 81 mg PO DAILY 30 Days #30 tab.chew 02/17/18 


Atorvastatin Ca [Lipitor] 10 mg PO HS 30 Days #30 tablet 02/17/18 


Lisinopril [Prinivil -] 10 mg PO DAILY 30 Days #30 tablet 02/17/18 


Metformin HCl 500 mg PO BID 30 Days #60 tablet 02/17/18 











- Diagnosis


(1) Alcohol dependence with uncomplicated withdrawal


Status: Acute   





(2) Cocaine dependence


Status: Acute   


Qualifiers: 


   Substance use status: uncomplicated   Qualified Code(s): F14.20 - Cocaine 

dependence, uncomplicated   





(3) Insomnia


Status: Acute   


Qualifiers: 


   Insomnia type: unspecified   Qualified Code(s): G47.00 - Insomnia, 

unspecified   





(4) Nicotine dependence


Status: Acute   


Qualifiers: 


   Nicotine product type: cigarettes   Substance use status: in withdrawal   

Qualified Code(s): F17.213 - Nicotine dependence, cigarettes, with withdrawal   





(5) Opioid dependence with withdrawal


Status: Acute   





(6) Asthma


Status: Chronic   


Qualifiers: 


   Asthma severity: mild   Asthma persistence: unspecified   Asthma 

complication type: uncomplicated   Qualified Code(s): J45.909 - Unspecified 

asthma, uncomplicated   





(7) Hypercholesterolemia


Status: Chronic   





(8) Hypertension


Status: Chronic   


Qualifiers: 


   Hypertension type: essential hypertension   Qualified Code(s): I10 - 

Essential (primary) hypertension   





(9) Type 2 diabetes mellitus


Status: Chronic   


Qualifiers: 


   Diabetes mellitus complication status: without complication   Diabetes 

mellitus long term insulin use: without long term use   Qualified Code(s): 

E11.9 - Type 2 diabetes mellitus without complications   





- AMA


Did Patient Leave Against Medical Advice: No